# Patient Record
Sex: FEMALE | Race: WHITE | NOT HISPANIC OR LATINO | Employment: OTHER | URBAN - METROPOLITAN AREA
[De-identification: names, ages, dates, MRNs, and addresses within clinical notes are randomized per-mention and may not be internally consistent; named-entity substitution may affect disease eponyms.]

---

## 2017-07-07 ENCOUNTER — GENERIC CONVERSION - ENCOUNTER (OUTPATIENT)
Dept: OTHER | Facility: OTHER | Age: 82
End: 2017-07-07

## 2017-10-11 ENCOUNTER — GENERIC CONVERSION - ENCOUNTER (OUTPATIENT)
Dept: OTHER | Facility: OTHER | Age: 82
End: 2017-10-11

## 2017-10-11 DIAGNOSIS — E78.5 HYPERLIPIDEMIA: ICD-10-CM

## 2018-01-15 NOTE — PROGRESS NOTES
Assessment    1  Acute bronchitis (466 0) (J20 9)    Plan   Continue with current medications  Reassured her that she is improving and that no further treatment will probably be necessary  Patient seems content with that answer and feels better that she does not need further medication to     Chief Complaint  FOLLOW UP URGENT CARE 01/18/2016  BRONCHITIS  History of Present Illness  Patient is an 59-year-old female presented to the office complaining of coughing congestion  Went to the urgent care 4 days ago had a normal chest x-ray  She was concerned that she was continuing to cough  The cough continues to improve, less severe, less productive, less frequent  The patient is being seen for follow-up of a recent acute care evaluation of acute bronchitis  The last clinic visit was 1 1/2 week(s) ago  Symptoms:  non-productive cough and muscle ache, but no wheezing, no shortness of breath, no fever, no fatigue, no sore throat, no rhinorrhea, no pleuritic chest pain and no chest pain  The sputum is described as clear  Onset was sudden 2 week(s) ago  The symptoms occur constantly  Relieving factors:  Tessalon Perles  Associated symptoms:  no rapid breathing, no rapid heart rate, no postnasal drainage, no headache and no hemoptysis  Current treatment includes Tessalon Perles  By report, there is good compliance with treatment, good tolerance of treatment and good symptom control  She was previously evaluated in Urgent Care and Initially seen in this office on January 4, seen in the urgent care Center 4 days ago  Presenting symptoms included productive cough and rhinorrhea  Past evaluation has included chest x-ray  Past treatment has included opioid cough medicine  Review of Systems    Constitutional: No fever, no chills, feels well, no tiredness, no recent weight gain or weight loss  Eyes: No complaints of eye pain, no red eyes, no eyesight problems, no discharge, no dry eyes, no itching of eyes     ENT: no complaints of earache, no loss of hearing, no nose bleeds, no nasal discharge, no sore throat, no hoarseness  Cardiovascular: No complaints of slow heart rate, no fast heart rate, no chest pain, no palpitations, no leg claudication, no lower extremity edema  Respiratory: as noted in HPI  Gastrointestinal: No complaints of abdominal pain, no constipation, no nausea or vomiting, no diarrhea, no bloody stools  Genitourinary: No complaints of dysuria, no incontinence, no pelvic pain, no dysmenorrhea, no vaginal discharge or bleeding  Musculoskeletal: No complaints of arthralgias, no myalgias, no joint swelling or stiffness, no limb pain or swelling  Integumentary: No complaints of skin rash or lesions, no itching, no skin wounds, no breast pain or lump  Neurological: No complaints of headache, no confusion, no convulsions, no numbness, no dizziness or fainting, no tingling, no limb weakness, no difficulty walking  Psychiatric: Not suicidal, no sleep disturbance, no anxiety or depression, no change in personality, no emotional problems  Endocrine: No complaints of proptosis, no hot flashes, no muscle weakness, no deepening of the voice, no feelings of weakness  Hematologic/Lymphatic: No complaints of swollen glands, no swollen glands in the neck, does not bleed easily, does not bruise easily  Active Problems    1  Acute bronchitis (466 0) (J20 9)   2  Cold (460) (J00)   3  Cough (786 2) (R05)   4  Gastroesophageal reflux disease (530 81) (K21 9)   5  Hyperlipidemia (272 4) (E78 5)   6  Left carpal tunnel syndrome (354 0) (G56 02)   7  Low back pain (724 2) (M54 5)   8  Mid back pain (724 5) (M54 9)   9  Osteoarthritis of left knee (715 96) (M17 9)    Past Medical History    1  History of Atypical chest pain (786 59) (R07 89)   2  History of Bullous pemphigoid (694 5) (L12 0)   3  History of Calcific Tendonitis Of Hand (727 82)   4  History of Cellulitis (682 9) (L03 90)   5   History of Chronic kidney disease, stage 3 (585 3) (N18 3)   6  History of Colon, diverticulosis (562 10) (K57 30)   7  History of External Hemorrhoids (455 3)   8  History of Glaucoma screening (V80 1) (Z13 5)   9  H/O renal cell carcinoma (V10 52) (Z85 528)   10  History of abdominal pain (V13 89) (Z87 898)   11  History of abdominal pain (V13 89) (Z87 898)   12  History of acute bronchitis (V12 69) (Z87 09)   13  History of acute gastritis (V12 70) (Z87 19)   14  History of diverticulitis of colon (V12 79) (Z87 19)   15  History of fatigue (V13 89) (Z87 898)   16  History of gout (V12 29) (Z87 39)   17  History of intestinal obstruction (V12 79) (Z87 19)   18  History of nausea and vomiting (V12 79) (Z87 898)   19  History of Ingrown hair (704 8) (L73 1)   20  History of Lumbar disc disorder with myelopathy (722 73) (M51 06)   21  History of Malignant neoplasm of kidney, unspecified laterality (189 0) (C64 9)   22  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)   23  History of Restless legs syndrome (333 94) (G25 81)   24  History of Synovial cyst of popliteal space (727 51) (M71 20)   25  History of Upper respiratory infection (465 9) (J06 9)   26  Urinary tract infection (599 0) (N39 0)    The active problems and past medical history were reviewed and updated today  Surgical History    1  History of Breast Surgery Reduction Procedure   2  History of Cataract Surgery   3  History of Nephrectomy   4  History of Partial Colectomy   5  History of Total Abdominal Hysterectomy    Family History    1  No pertinent family history    2  Family history of No Significant Family History    Social History    · Being A Social Drinker   · Daily Coffee Consumption (3  Cups/Day)   · Former smoker (V15 82) (X18 857)   · Uses Safety Equipment - Seatbelts  The social history was reviewed and updated today  The social history was reviewed and is unchanged  Current Meds   1   Allopurinol 100 MG Oral Tablet; TAKE TWO TABLETS BY MOUTH DAILY; Therapy: 16ZHL6496 to (Evaluate:24Jan2016)  Requested for: 22VWS2652; Last   Rx:19Mhk5072 Ordered   2  Benzonatate 200 MG Oral Capsule; TAKE 1 CAPSULE 3 TIMES DAILY AS NEEDED FOR   COUGH  NOT TO BE TAKEN PRIOR TO DRIVING OR OPERATING MACHINERY AS IT   CAN CAUSE DROWSINESS; Therapy: 32NCZ3718 to (Evaluate:25Jan2016); Last Rx:18Jan2016 Ordered   3  Hydrocortisone 2 5 % Rectal Cream; apply small amount to effected area 4 times daily   for 10 dyas; Therapy: 70IJV9514 to (Last Rx:03Jan2014)  Requested for: 82APR3242 Ordered   4  Ocuvite Oral Tablet; TAKE 1 TABLET DAILY; Therapy: (Recorded:26Eqc7798) to Recorded   5  Omeprazole 20 MG Oral Capsule Delayed Release; TAKE 1 CAPSULE TWICE DAILY; Therapy: 69LDG4820 to (Evaluate:96Ggl2545)  Requested for: 80Kkx2098; Last   Rx:16Xip6188 Ordered   6  Pravastatin Sodium 80 MG Oral Tablet; take 1 tablet every day; Therapy: 60CJX6735 to (Evaluate:10Jan2016)  Requested for: 66YHP5344; Last   Rx:15Fiy3392 Ordered   7  PreserVision AREDS 2 CAPS; TAKE AS DIRECTED; Therapy: (Recorded:54Gxf4563) to Recorded    The medication list was reviewed and updated today  Allergies    1  No Known Drug Allergies    Vitals  Vital Signs [Data Includes: Current Encounter]    Recorded: 21Jan2016 11:58AM   Temperature 24 4 F   Systolic 315   Diastolic 80   Height 5 ft 1 in   Weight 137 lb 6 oz   BMI Calculated 25 96   BSA Calculated 1 61     Physical Exam    Constitutional   General appearance: No acute distress, well appearing and well nourished  Eyes   Conjunctiva and lids: No swelling, erythema or discharge  Pupils and irises: Equal, round and reactive to light  Ears, Nose, Mouth, and Throat   External inspection of ears and nose: Normal     Otoscopic examination: Tympanic membranes translucent with normal light reflex  Canals patent without erythema  Nasal mucosa, septum, and turbinates: Normal without edema or erythema      Oropharynx: Normal with no erythema, edema, exudate or lesions  Pulmonary   Respiratory effort: No increased work of breathing or signs of respiratory distress  Auscultation of lungs: Clear to auscultation  Cardiovascular   Palpation of heart: Normal PMI, no thrills  Auscultation of heart: Normal rate and rhythm, normal S1 and S2, without murmurs  Examination of extremities for edema and/or varicosities: Normal     Carotid pulses: Normal     Abdomen   Abdomen: Non-tender, no masses  Liver and spleen: No hepatomegaly or splenomegaly  Lymphatic   Palpation of lymph nodes in neck: No lymphadenopathy  Musculoskeletal   Gait and station: Normal     Digits and nails: Normal without clubbing or cyanosis  Inspection/palpation of joints, bones, and muscles: Normal     Skin   Skin and subcutaneous tissue: Normal without rashes or lesions  Neurologic   Cranial nerves: Cranial nerves 2-12 intact  Reflexes: 2+ and symmetric  Sensation: No sensory loss      Psychiatric   Orientation to person, place, and time: Normal     Mood and affect: Normal          Signatures   Electronically signed by : Marjorie Hood DO; Jan 21 2016 12:37PM EST                       (Author)

## 2018-01-18 NOTE — PROGRESS NOTES
Assessment    1  Acute bronchitis (466 0) (J20 9)   2  Mid back pain (724 5) (M54 9)    Plan  Acute bronchitis    · Benzonatate 200 MG Oral Capsule; TAKE 1 CAPSULE 3 TIMES DAILY AS NEEDED  FOR COUGH  NOT TO BE TAKEN PRIOR TO DRIVING OR OPERATING MACHINERY AS  IT CAN CAUSE DROWSINESS   · Avoid exposure to cigarette smoke ; Status:Complete;   Done: 83GEE0936   · Drink plenty of fluids ; Status:Complete;   Done: 24QJW1477   · Use a cool mist humidifier in the room ; Status:Complete;   Done: 65MNY9115   · Use a cough medicine to help you get adequate rest ; Status:Complete;   Done:  95DDU8570   · Seek Immediate Medical Attention if: ; Status:Complete;   Done: 53AAE5500   · Seek Immediate Medical Attention if: You are feeling short of breath ; Status:Complete;    Done: 47TBY2968   · Seek Immediate Medical Attention if: You have difficulty breathing, or you are short of  breath more often ; Status:Complete;   Done: 76ZYR9694   · Seek Immediate Medical Attention if: You notice that breathing is rapid, more than 40  times a minute ; Status:Complete;   Done: 26MDL3630  Cough    · * XR CHEST PA & LATERAL; Status:Active; Requested TDW:51HFP7142;     Discussion/Summary  Discussion Summary:   1  Acute Bronchitis   - awaiting official CXR results   - Zithromax course completed   - may take Promethazine w/ codeine at bedtime as needed for cough  - prescribing Tessalon pearls to be taken during the day as needed for cough  - advised to rest and drink and plenty of fluids   - instructed to follow up w/ PCP later this week for a re-check  2  Mid back pain likely due to a muscle strain   - advised a heating pad, rest, and Tylenol or Motrin as needed for pain   - follow up w/ PCP later this week for a re-check  Medication Side Effects Reviewed: Possible side effects of new medications were reviewed with the patient/guardian today  Understands and agrees with treatment plan:  The treatment plan was reviewed with the patient/guardian  The patient/guardian understands and agrees with the treatment plan   Counseling Documentation With Imm: The patient, patient's family was counseled regarding instructions for management, importance of compliance with treatment  Follow Up Instructions: Follow Up with your Primary Care Provider in 3-5 days  If your symptoms worsen, go to the nearest [place] [address] Emergency Department  Chief Complaint    1  Cold Symptoms  Chief Complaint Free Text Note Form: pt here for cold symptoms      History of Present Illness  HPI: 79 yo F presents c/o cold symptoms that have been going on for about 1 month  States the upper resp tract infection symptoms have resolved however the cough still persists, describes as productive sometimes  No fever/chills  No chest pain, SOB, or wheezing  Non-smoker  Seen by PCP on 1/4/15 for same sx, was prescribed Bactrim and cough syrup w/ codeine  States she was later switched to Azithromycin and put on a course of steroids, states she took her last pill of the antibiotic this morning  Also c/o left sided mid back pain for the past few days which she thinks is due to coughing  Denies any trauma to the back  No falls, injuries, or accidents  Has been applying a heating pad  Hospital Based Practices Required Assessment:   Pain Assessment   the patient states they do not have pain  Abuse And Domestic Violence Screen    Yes, the patient is safe at home  The patient states no one is hurting them  Depression And Suicide Screen  No, the patient has not had thoughts of hurting themself  No, the patient has not felt depressed in the past 7 days  Prefered Language is  english  Primary Language is  english     Cold Symptoms:   Associated symptoms include productive cough, but no sneezing, no nasal congestion, no runny nose, no post nasal drainage, no scratchy throat, no sore throat, no hoarseness, no dry cough, no facial pressure, no facial pain, no headache, no plugged ear(s), no ear pain, no swollen lymph nodes, no wheezing, no shortness of breath, no fatigue, no weakness, no nausea, no vomiting, no diarrhea, no fever and no chills  Review of Systems  Focused-Female:   Constitutional: No fever, no chills, feels well, no tiredness, no recent weight gain or loss  ENT: no ear ache, no loss of hearing, no nosebleeds or nasal discharge, no sore throat or hoarseness  Cardiovascular: no complaints of slow or fast heart rate, no chest pain, no palpitations, no leg claudication or lower extremity edema, no chest pain and no palpitations  Respiratory: cough, but as noted in HPI, no shortness of breath and no wheezing  Gastrointestinal: no complaints of abdominal pain, no constipation, no nausea or diarrhea, no vomiting, no bloody stools  Musculoskeletal: as noted in HPI  Integumentary: no complaints of skin rash or lesion, no itching or dry skin, no skin wounds  Neurological: no complaints of headache, no confusion, no numbness or tingling, no dizziness or fainting  Active Problems    1  Gastroesophageal reflux disease (530 81) (K21 9)   2  Hyperlipidemia (272 4) (E78 5)   3  Left carpal tunnel syndrome (354 0) (G56 02)   4  Low back pain (724 2) (M54 5)   5  Osteoarthritis of left knee (715 96) (M17 9)    Past Medical History    1  History of Atypical chest pain (786 59) (R07 89)   2  History of Bullous pemphigoid (694 5) (L12 0)   3  History of Calcific Tendonitis Of Hand (727 82)   4  History of Cellulitis (682 9) (L03 90)   5  History of Chronic kidney disease, stage 3 (585 3) (N18 3)   6  History of Colon, diverticulosis (562 10) (K57 30)   7  History of External Hemorrhoids (455 3)   8  History of Glaucoma screening (V80 1) (Z13 5)   9  H/O renal cell carcinoma (V10 52) (Z85 528)   10  History of abdominal pain (V13 89) (Z87 898)   11  History of abdominal pain (V13 89) (Z87 898)   12  History of acute bronchitis (V12 69) (Z87 09)   13   History of acute gastritis (V12 70) (Z87 19)   14  History of diverticulitis of colon (V12 79) (Z87 19)   15  History of fatigue (V13 89) (Z87 898)   16  History of gout (V12 29) (Z87 39)   17  History of intestinal obstruction (V12 79) (Z87 19)   18  History of nausea and vomiting (V12 79) (Z87 898)   19  History of Ingrown hair (704 8) (L73 1)   20  History of Lumbar disc disorder with myelopathy (722 73) (M51 06)   21  History of Malignant neoplasm of kidney, unspecified laterality (189 0) (C64 9)   22  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)   23  History of Restless legs syndrome (333 94) (G25 81)   24  History of Synovial cyst of popliteal space (727 51) (M71 20)   25  History of Upper respiratory infection (465 9) (J06 9)   26  Urinary tract infection (599 0) (N39 0)    Family History    1  No pertinent family history    2  Family history of No Significant Family History    Social History    · Being A Social Drinker   · Daily Coffee Consumption (3  Cups/Day)   · Former smoker (V15 82) (K50 041)   · Uses Safety Equipment - Seatbelts    Surgical History    1  History of Breast Surgery Reduction Procedure   2  History of Cataract Surgery   3  History of Nephrectomy   4  History of Partial Colectomy   5  History of Total Abdominal Hysterectomy    Current Meds   1  Allopurinol 100 MG Oral Tablet; TAKE TWO TABLETS BY MOUTH DAILY; Therapy: 19XUU2695 to (Evaluate:24Jan2016)  Requested for: 06OUY5824; Last   Rx:26Oct2015 Ordered   2  Hydrocortisone 2 5 % Rectal Cream; apply small amount to effected area 4 times daily   for 10 dyas; Therapy: 63GTT2783 to (Last Rx:03Jan2014)  Requested for: 10UAP1545 Ordered   3  Ocuvite Oral Tablet; TAKE 1 TABLET DAILY; Therapy: (Recorded:44Wgb5920) to Recorded   4  Omeprazole 20 MG Oral Capsule Delayed Release; TAKE 1 CAPSULE TWICE DAILY; Therapy: 80LQO5455 to (Evaluate:47Iuk6097)  Requested for: 68Drn7896; Last   Rx:98Cqj6465 Ordered   5   Pravastatin Sodium 80 MG Oral Tablet; take 1 tablet every day; Therapy: 75RQA5375 to (Evaluate:10Jan2016)  Requested for: 74PZC9931; Last   Rx:55Mgd5092 Ordered   6  PreserVision AREDS 2 CAPS; TAKE AS DIRECTED; Therapy: (Recorded:12Ubw0508) to Recorded    Allergies    1  No Known Drug Allergies    Vitals  Signs [Data Includes: Current Encounter]   Recorded: 03BYW3293 10:55AM   Temperature: 96 3 F  Heart Rate: 71  Respiration: 20  Systolic: 189  Diastolic: 764  Height: 5 ft 1 in  Weight: 136 lb   BMI Calculated: 25 7  BSA Calculated: 1 6  O2 Saturation: 97  Pain Scale: 0    Physical Exam    Constitutional   General appearance: No acute distress, well appearing and well nourished  Eyes   Conjunctiva and lids: No swelling, erythema or discharge  Pupils and irises: Equal, round and reactive to light  Ears, Nose, Mouth, and Throat   External inspection of ears and nose: Normal     Otoscopic examination: Tympanic membranes translucent with normal light reflex  Canals patent without erythema  Nasal mucosa, septum, and turbinates: Normal without edema or erythema  Oropharynx: Normal with no erythema, edema, exudate or lesions  Pulmonary   Respiratory effort: No increased work of breathing or signs of respiratory distress  Auscultation of lungs: Abnormal   very mild expiratory wheezing  Cardiovascular   Auscultation of heart: Normal rate and rhythm, normal S1 and S2, without murmurs  Lymphatic   Palpation of lymph nodes in neck: No lymphadenopathy  Musculoskeletal   Inspection/palpation of joints, bones, and muscles: Abnormal   mild tenderness to palpation along the thoracic paraspinal region  No swelling, erythema, warmth, or bruising  No step offs palpated  No skin rashes  Skin   Skin and subcutaneous tissue: Normal without rashes or lesions      Psychiatric   Orientation to person, place, and time: Normal     Mood and affect: Normal        Signatures   Electronically signed by : Shoshana Soulier, M D ; Jan 18 2016 11:51AM EST (Author)

## 2019-08-07 ENCOUNTER — TRANSCRIBE ORDERS (OUTPATIENT)
Dept: ADMINISTRATIVE | Facility: HOSPITAL | Age: 84
End: 2019-08-07

## 2019-08-07 DIAGNOSIS — Z85.528 PERSONAL HISTORY OF RENAL CANCER: Primary | ICD-10-CM

## 2019-08-09 ENCOUNTER — HOSPITAL ENCOUNTER (OUTPATIENT)
Dept: ULTRASOUND IMAGING | Facility: MEDICAL CENTER | Age: 84
Discharge: HOME | End: 2019-08-09
Payer: MEDICARE

## 2019-08-09 DIAGNOSIS — Z85.528 PERSONAL HISTORY OF RENAL CANCER: ICD-10-CM

## 2019-08-09 PROCEDURE — 76770 US EXAM ABDO BACK WALL COMP: CPT

## 2020-07-02 NOTE — H&P (VIEW-ONLY)
Colon and Rectal Surgery   Johnathan Conner 80 y o  female MRN: [id number]   Encounter: [id number]  07/06/20   4:57 PM    ASSESSMENT/PLAN:   Anal inflammation/ulceration, multiple physicians seen and multiple biopsies taken, records review includes [facility] Salter notes 1/2020 stating that these ulcers are present 5 years, and references pelvic MRI 2/2017 normal   Discussed with her that this is a chronic and ongoing problem I cannot rule out anal Crohn's disease or other rheumatologic spectrum        Ongoing anal ulceration today without improvement, discussed need for biopsies rule out dysplasia, also on review with poor prep colonoscopy will try for at least Sigmoidoscopy with exam under anesthesia with anal biopsies in OR  HPI  Johnathan Conner is a 80 y o  female is here today for a follow up to anal inflammation  Her last office visit was on 6/1/20, prescribed budesonide 9 mg  She reports she has been taking the budesonide as prescribed and has seen little to no improvement in her symptoms  She has not used any suppositories since her last office visit  She has a history of sigmoid resection for diverticulitis, sphincterectomy in 2016 for anal fissure  Her most recent colonoscopy was December of 2016 with Dr Wanda Pierre, difficult intubation to cecum with poor visualization on scanned document reviewed        Past medical history- care everywhere  Hx renal cell cancer  Anal fissure  Hypertrophic cardiomyopathy  GERD   Mixed hyperlipidemia  Lung nodule- stable  Vitamin D deficiency    Pshx  Sigmoid colectomy  Nephrectomy  Sphincterotomy    Meds/Allergies       Current Outpatient Medications:     allopurinol (ZYLOPRIM) 100 mg tablet, Take 100 mg by mouth 2 (two) times a day, Disp: , Rfl:     budesonide (ENTOCORT EC) 3 MG capsule, Take 3 capsules (9 mg total) by mouth daily, Disp: 90 capsule, Rfl: 3    fluticasone (FLONASE) 50 mcg/act nasal spray, USE ONE SPRAY IN EACH NOSTRIL TWICE DAILY, Disp: , Rfl:     JER 0 01 % ophthalmic drops, , Disp: , Rfl:     Multiple Vitamins-Minerals (PRESERVISION AREDS 2) CAPS, Take by mouth, Disp: , Rfl:       No Known Allergies      Social History   Social History     Substance and Sexual Activity   Alcohol Use Not on file     Social History     Substance and Sexual Activity   Drug Use Not on file     Social History     Tobacco Use   Smoking Status Not on file         Family History:   Family History   Problem Relation Age of Onset    Colon cancer Neg Hx        Review of Systems   Constitutional: Negative  HENT: Negative  Eyes: Negative  Respiratory: Negative  Cardiovascular: Negative  Gastrointestinal: Positive for rectal pain  Endocrine: Negative  Genitourinary: Negative  Musculoskeletal: Negative  Skin: Negative  Allergic/Immunologic: Negative  Neurological: Negative  Hematological: Negative  Psychiatric/Behavioral: Negative          Objective     Current Vitals:   Vitals:    07/06/20 1248   Weight: 58 5 kg (129 lb)         Physical Exam:  General:no distress  Eyes:perrla/eomi  ENT:moist mucus membranes  Neck:supple  Pulm:no increased work of breathing, clear bilateral  CV:sinus  Abdomen:soft,nontender  Rectal:anal ulcerations/leukoplakia as previously described, tender digital exam discontinued  Extremities:no edema

## 2020-07-08 DIAGNOSIS — Z11.59 SPECIAL SCREENING EXAMINATION FOR UNSPECIFIED VIRAL DISEASE: ICD-10-CM

## 2020-07-08 PROCEDURE — U0003 INFECTIOUS AGENT DETECTION BY NUCLEIC ACID (DNA OR RNA); SEVERE ACUTE RESPIRATORY SYNDROME CORONAVIRUS 2 (SARS-COV-2) (CORONAVIRUS DISEASE [COVID-19]), AMPLIFIED PROBE TECHNIQUE, MAKING USE OF HIGH THROUGHPUT TECHNOLOGIES AS DESCRIBED BY CMS-2020-01-R: HCPCS

## 2020-07-08 RX ORDER — ACETAMINOPHEN 325 MG/1
650 TABLET ORAL EVERY 6 HOURS PRN
COMMUNITY

## 2020-07-08 RX ORDER — BRIMONIDINE TARTRATE, TIMOLOL MALEATE 2; 5 MG/ML; MG/ML
1 SOLUTION/ DROPS OPHTHALMIC EVERY 12 HOURS SCHEDULED
COMMUNITY

## 2020-07-08 NOTE — PRE-PROCEDURE INSTRUCTIONS
Pre-Surgery Instructions:   Medication Instructions    acetaminophen (TYLENOL) 325 mg tablet Instructed patient per Anesthesia Guidelines   allopurinol (ZYLOPRIM) 100 mg tablet Instructed patient per Anesthesia Guidelines   brimonidine-timolol (Combigan) 0 2-0 5 % Instructed patient per Anesthesia Guidelines   budesonide (ENTOCORT EC) 3 MG capsule Instructed patient per Anesthesia Guidelines   Cetirizine-Pseudoephedrine (KLS ALLER-INNA D PO) Instructed patient per Anesthesia Guidelines   fluticasone (FLONASE) 50 mcg/act nasal spray Instructed patient per Anesthesia Guidelines   LUMIGAN 0 01 % ophthalmic drops Instructed patient per Anesthesia Guidelines   Multiple Vitamins-Minerals (PRESERVISION AREDS 2) CAPS Instructed patient per Anesthesia Guidelines   Multiple Vitamins-Minerals (PRESERVISION AREDS PO) Instructed patient per Anesthesia Guidelines   VITAMIN D PO Instructed patient per Anesthesia Guidelines  Have you had / have a sore throat? No  have you had / have a cough less than 1 week? No  Have you had / have a fever greater than 100 0 - 100  4? No  Are you experiencing any shortness of breath? No    Review with patient via phone medications and showering instructions  Advice stop vitamins week prior day of surgery  Advice need covid test done 7 days prior DOS  Advice ASC call  Verbalized understanding  Acetaminophen Med Class   Continue to take this medication on your normal schedule  If this is an oral medication and you take it in the morning, then you may take this medicine with a sip of water  Anti-gout Med Class   Continue to take this medication on your normal schedule  If this is an oral medication and you take it in the morning, then you may take this medicine with a sip of water  Steroids Med Class   Continue to take this medication on your normal schedule    If this is an oral medication and you take it in the morning, then you may take this medicine with a sip of water  Vitamin Med Class   You may continue to take any vitamin that your surgeon has prescribed to you up to the day before surgery   If your surgeon has not specifically prescribed this vitamin or instructed you to continue then stop taking 7 days prior to surgery

## 2020-07-09 LAB
INPATIENT: NORMAL
SARS-COV-2 RNA SPEC QL NAA+PROBE: NOT DETECTED

## 2020-07-15 ENCOUNTER — HOSPITAL ENCOUNTER (OUTPATIENT)
Facility: HOSPITAL | Age: 85
Setting detail: OUTPATIENT SURGERY
Discharge: HOME | End: 2020-07-15
Attending: COLON & RECTAL SURGERY | Admitting: COLON & RECTAL SURGERY
Payer: COMMERCIAL

## 2020-07-15 ENCOUNTER — ANESTHESIA EVENT (OUTPATIENT)
Dept: PERIOP | Facility: HOSPITAL | Age: 85
End: 2020-07-15
Payer: COMMERCIAL

## 2020-07-15 ENCOUNTER — ANESTHESIA (OUTPATIENT)
Dept: PERIOP | Facility: HOSPITAL | Age: 85
End: 2020-07-15
Payer: COMMERCIAL

## 2020-07-15 VITALS
BODY MASS INDEX: 24.54 KG/M2 | WEIGHT: 125 LBS | SYSTOLIC BLOOD PRESSURE: 145 MMHG | OXYGEN SATURATION: 96 % | HEART RATE: 67 BPM | DIASTOLIC BLOOD PRESSURE: 74 MMHG | RESPIRATION RATE: 18 BRPM | TEMPERATURE: 96.3 F | HEIGHT: 60 IN

## 2020-07-15 DIAGNOSIS — K62.6 ANAL ULCERATION: Primary | ICD-10-CM

## 2020-07-15 PROCEDURE — 88312 SPECIAL STAINS GROUP 1: CPT | Performed by: PATHOLOGY

## 2020-07-15 PROCEDURE — 88304 TISSUE EXAM BY PATHOLOGIST: CPT | Performed by: PATHOLOGY

## 2020-07-15 PROCEDURE — 88344 IMHCHEM/IMCYTCHM EA MLT ANTB: CPT | Performed by: PATHOLOGY

## 2020-07-15 PROCEDURE — 88342 IMHCHEM/IMCYTCHM 1ST ANTB: CPT | Performed by: PATHOLOGY

## 2020-07-15 PROCEDURE — 88341 IMHCHEM/IMCYTCHM EA ADD ANTB: CPT | Performed by: PATHOLOGY

## 2020-07-15 PROCEDURE — 45990 SURG DX EXAM ANORECTAL: CPT | Performed by: COLON & RECTAL SURGERY

## 2020-07-15 RX ORDER — HYDROCODONE BITARTRATE AND ACETAMINOPHEN 5; 325 MG/1; MG/1
1 TABLET ORAL EVERY 6 HOURS PRN
Qty: 5 TABLET | Refills: 0 | Status: SHIPPED | OUTPATIENT
Start: 2020-07-15 | End: 2020-07-20

## 2020-07-15 RX ORDER — METOCLOPRAMIDE HYDROCHLORIDE 5 MG/ML
5 INJECTION INTRAMUSCULAR; INTRAVENOUS ONCE AS NEEDED
Status: DISCONTINUED | OUTPATIENT
Start: 2020-07-15 | End: 2020-07-15 | Stop reason: HOSPADM

## 2020-07-15 RX ORDER — EPHEDRINE SULFATE 50 MG/ML
INJECTION INTRAVENOUS AS NEEDED
Status: DISCONTINUED | OUTPATIENT
Start: 2020-07-15 | End: 2020-07-15 | Stop reason: SURG

## 2020-07-15 RX ORDER — BUPIVACAINE HYDROCHLORIDE 2.5 MG/ML
INJECTION, SOLUTION EPIDURAL; INFILTRATION; INTRACAUDAL AS NEEDED
Status: DISCONTINUED | OUTPATIENT
Start: 2020-07-15 | End: 2020-07-15 | Stop reason: HOSPADM

## 2020-07-15 RX ORDER — DEXMEDETOMIDINE HYDROCHLORIDE 100 UG/ML
INJECTION, SOLUTION INTRAVENOUS AS NEEDED
Status: DISCONTINUED | OUTPATIENT
Start: 2020-07-15 | End: 2020-07-15 | Stop reason: SURG

## 2020-07-15 RX ORDER — ONDANSETRON 2 MG/ML
4 INJECTION INTRAMUSCULAR; INTRAVENOUS ONCE AS NEEDED
Status: DISCONTINUED | OUTPATIENT
Start: 2020-07-15 | End: 2020-07-15 | Stop reason: HOSPADM

## 2020-07-15 RX ORDER — PROPOFOL 10 MG/ML
INJECTION, EMULSION INTRAVENOUS AS NEEDED
Status: DISCONTINUED | OUTPATIENT
Start: 2020-07-15 | End: 2020-07-15 | Stop reason: SURG

## 2020-07-15 RX ORDER — GLYCOPYRROLATE 0.2 MG/ML
INJECTION INTRAMUSCULAR; INTRAVENOUS AS NEEDED
Status: DISCONTINUED | OUTPATIENT
Start: 2020-07-15 | End: 2020-07-15 | Stop reason: SURG

## 2020-07-15 RX ORDER — LIDOCAINE HYDROCHLORIDE 10 MG/ML
INJECTION, SOLUTION EPIDURAL; INFILTRATION; INTRACAUDAL; PERINEURAL AS NEEDED
Status: DISCONTINUED | OUTPATIENT
Start: 2020-07-15 | End: 2020-07-15 | Stop reason: SURG

## 2020-07-15 RX ORDER — KETAMINE HYDROCHLORIDE 50 MG/ML
INJECTION, SOLUTION, CONCENTRATE INTRAMUSCULAR; INTRAVENOUS AS NEEDED
Status: DISCONTINUED | OUTPATIENT
Start: 2020-07-15 | End: 2020-07-15 | Stop reason: SURG

## 2020-07-15 RX ORDER — SODIUM CHLORIDE, SODIUM LACTATE, POTASSIUM CHLORIDE, CALCIUM CHLORIDE 600; 310; 30; 20 MG/100ML; MG/100ML; MG/100ML; MG/100ML
INJECTION, SOLUTION INTRAVENOUS CONTINUOUS PRN
Status: DISCONTINUED | OUTPATIENT
Start: 2020-07-15 | End: 2020-07-15 | Stop reason: SURG

## 2020-07-15 RX ORDER — PROPOFOL 10 MG/ML
INJECTION, EMULSION INTRAVENOUS CONTINUOUS PRN
Status: DISCONTINUED | OUTPATIENT
Start: 2020-07-15 | End: 2020-07-15 | Stop reason: SURG

## 2020-07-15 RX ORDER — FENTANYL CITRATE/PF 50 MCG/ML
12.5 SYRINGE (ML) INJECTION
Status: COMPLETED | OUTPATIENT
Start: 2020-07-15 | End: 2020-07-15

## 2020-07-15 RX ORDER — ALBUTEROL SULFATE 2.5 MG/3ML
2.5 SOLUTION RESPIRATORY (INHALATION) ONCE AS NEEDED
Status: DISCONTINUED | OUTPATIENT
Start: 2020-07-15 | End: 2020-07-15 | Stop reason: HOSPADM

## 2020-07-15 RX ORDER — GINSENG 100 MG
CAPSULE ORAL AS NEEDED
Status: DISCONTINUED | OUTPATIENT
Start: 2020-07-15 | End: 2020-07-15 | Stop reason: HOSPADM

## 2020-07-15 RX ADMIN — PHENYLEPHRINE HYDROCHLORIDE 30 MCG/MIN: 10 INJECTION INTRAVENOUS at 08:59

## 2020-07-15 RX ADMIN — DEXMEDETOMIDINE 8 MCG: 100 INJECTION, SOLUTION, CONCENTRATE INTRAVENOUS at 08:50

## 2020-07-15 RX ADMIN — KETAMINE HYDROCHLORIDE 10 MG: 50 INJECTION, SOLUTION INTRAMUSCULAR; INTRAVENOUS at 08:37

## 2020-07-15 RX ADMIN — PROPOFOL 20 MG: 10 INJECTION, EMULSION INTRAVENOUS at 08:50

## 2020-07-15 RX ADMIN — EPHEDRINE SULFATE 5 MG: 50 INJECTION, SOLUTION INTRAVENOUS at 08:56

## 2020-07-15 RX ADMIN — KETAMINE HYDROCHLORIDE 5 MG: 50 INJECTION, SOLUTION INTRAMUSCULAR; INTRAVENOUS at 08:51

## 2020-07-15 RX ADMIN — SODIUM CHLORIDE, SODIUM LACTATE, POTASSIUM CHLORIDE, AND CALCIUM CHLORIDE: .6; .31; .03; .02 INJECTION, SOLUTION INTRAVENOUS at 08:31

## 2020-07-15 RX ADMIN — FENTANYL CITRATE 12.5 MCG: 50 INJECTION INTRAMUSCULAR; INTRAVENOUS at 09:33

## 2020-07-15 RX ADMIN — GLYCOPYRROLATE 0.1 MG: 0.2 INJECTION, SOLUTION INTRAMUSCULAR; INTRAVENOUS at 08:37

## 2020-07-15 RX ADMIN — FENTANYL CITRATE 12.5 MCG: 50 INJECTION INTRAMUSCULAR; INTRAVENOUS at 09:46

## 2020-07-15 RX ADMIN — PROPOFOL 20 MG: 10 INJECTION, EMULSION INTRAVENOUS at 08:48

## 2020-07-15 RX ADMIN — PROPOFOL 50 MCG/KG/MIN: 10 INJECTION, EMULSION INTRAVENOUS at 08:37

## 2020-07-15 RX ADMIN — DEXMEDETOMIDINE 4 MCG: 100 INJECTION, SOLUTION, CONCENTRATE INTRAVENOUS at 08:46

## 2020-07-15 RX ADMIN — DEXMEDETOMIDINE 4 MCG: 100 INJECTION, SOLUTION, CONCENTRATE INTRAVENOUS at 08:48

## 2020-07-15 RX ADMIN — LIDOCAINE HYDROCHLORIDE 50 MG: 10 INJECTION, SOLUTION EPIDURAL; INFILTRATION; INTRACAUDAL; PERINEURAL at 08:37

## 2020-07-15 NOTE — DISCHARGE INSTRUCTIONS
May shower/tub bathe this evening  There are couple tiny beige sutures left in place  There may be some bleeding/drainage, normal , may use dry gauze    Followup in office 6-8weeks Dr Canales Blend  Referral placed for Dr Paulino [facility] for inflammatory bowel disease evaluation  Vicodin as prescribed for pain not treated by ibuprofen  High fiber diet with metamucil or konsyl 1 tbsp 1-2x/day, increased hydration noncaffeinated beverages  May use Miralax as needed if no bowel movements in next 24-48hours  Call if any concerns [phone]

## 2020-07-15 NOTE — OP NOTE
OPERATIVE REPORT  PATIENT NAME: Radha Escudero    :  1929  MRN: [id number]  Pt Location: [facility] OR ROOM 09    SURGERY DATE: 7/15/2020    Surgeon(s) and Role:     * Narciso Borrero MD - Primary     * Paige Payan MD - Assisting    Preop Diagnosis:  Anal ulceration [K62 6]    Post-Op Diagnosis Codes:     * Anal ulceration [K62 6]    Procedure(s) (LRB):  EXAM UNDER ANESTHESIA (EUA) (N/A)  Anoscopy  Anal biopsies    Specimen(s):  ID Type Source Tests Collected by Time Destination   1 : anal bx Tissue Anus TISSUE EXAM Narciso Borrero MD 7/15/2020 5283        Estimated Blood Loss:   Minimal    Drains:  * No LDAs found *    Anesthesia Type:   IV Sedation with Anesthesia    Operative Indications:  Anal ulceration [K62 6]    Operative Findings:  -no anal stenosis, circumferential denuded anal derm, minimal remaining strips of clear anodermal/epithelium, sloughing of perianal skin  -multiple Metzenbaum biopsies, as well as deeper 3 mm punch biopsies rule out dysplasia  -completely normal appearance to distal rectal mucosa visualized by anoscopy, stool in rectal vault, flexible sigmoidoscopy was not done    Complications:   None    Procedure and Technique:  22-year-old female with multiple years of chronic anal ulceration despite multiple workups, unable to do office examination, discussed exam under anesthesia with biopsies, discussed anorectal surgery and risks including not limited to bleeding, infection, risks of anesthesia, damage to sphincter/incontinence,pain, constipation/impaction/urinary retention  She understood these risks and wished to proceed  She was brought to the operating room where MAC anesthesia was induced without event  Venodynes were on and running throughout the procedure,received no antibiotics as none were medically indicated,placed in the prone jackknife position with attention to joints, extremities and genitalia  Buttocks were taped apart,Betadine prepped,sterile prepped and draped  After appropriate timeout per protocol procedure began with examination under anesthesia, digital rectal examination showed no stenosis, normal rectal mucosa, anoderm with near-complete denuded epithelium and exposed raw tissue with few strips of normal epithelium  Anoscopy with Padilla bivalve anoscope confirmed the above exam, multiple Metzenbaum biopsies were taken in circumference, deeper biopsies were closed with single 3 0 chromic suture for hemostasis, multiple punch biopsies with 3 mm punch taken as well rule out dysplasia  Hemostasis was assured  Bacitracin was placed to the anal area after irrigation  There was no packing placed  4 x 4's and mesh underwear were placed and patient was rotated supine and awakened brought to the recovery room in stable addition  All sponge, needle, instrument/RF wand counts were correct       I was present for the entire procedure    Patient Disposition:  PACU     SIGNATURE: Harjinder Tellez MD  DATE: July 15, 2020  TIME: 11:03 AM

## 2020-07-15 NOTE — ANESTHESIA PREPROCEDURE EVALUATION
Review of Systems/Medical History  Patient summary reviewed  Chart reviewed      Cardiovascular  Exercise tolerance (METS): >4,    Comment: Endorses chest pain secondary to chostochondiritis  Cardiology note: HCM diagnosed in 2017,  Pulmonary  Negative pulmonary ROS        GI/Hepatic      Comment: Anal ulceration     Genitourinary malignancy renal cancer,        Endo/Other  Negative endo/other ROS      GYN  Negative gynecology ROS          Hematology  Negative hematology ROS      Musculoskeletal  Negative musculoskeletal ROS        Neurology  Negative neurology ROS      Psychology   Negative psychology ROS              Physical Exam    Airway    Mallampati score: III  TM Distance: >3 FB  Neck ROM: full     Dental   upper dentures and lower dentures,     Cardiovascular      Pulmonary      Other Findings        Anesthesia Plan  ASA Score- 3     Anesthesia Type- IV sedation with anesthesia with ASA Monitors  Additional Monitors:   Airway Plan:     Comment: Plan for sedation with GAETT as backup        Plan Factors-    Induction- intravenous  Postoperative Plan- Plan for postoperative opioid use  Informed Consent- Anesthetic plan and risks discussed with patient  I personally reviewed this patient with the CRNA  Discussed and agreed on the Anesthesia Plan with the CRNA  Phillip Siegel

## 2020-07-15 NOTE — INTERVAL H&P NOTE
H&P reviewed  After examining the patient I find no changes in the patients condition since the H&P had been written    Lungs Clear bilateral  Heart Sinus Rhythm  Vitals:    07/15/20 0722   BP: 133/54   Pulse: 64   Resp: 16   Temp: (!) 96 9 °F (36 1 °C)   SpO2: 97%         Vitals:    07/15/20 0722   BP: 133/54   Pulse: 64   Resp: 16   Temp: (!) 96 9 °F (36 1 °C)   SpO2: 97%

## 2020-07-15 NOTE — ANESTHESIA POSTPROCEDURE EVALUATION
Post-Op Assessment Note    CV Status:  Stable  Pain Score: 5 (0 at rest, 5 with movement)    Pain management: adequate     Mental Status:  Alert and awake   Hydration Status:  Euvolemic and stable   PONV Controlled:  Controlled   Airway Patency:  Patent   Post Op Vitals Reviewed: Yes      Staff: Anesthesiologist, CRNA           BP   105/54   Temp     Pulse 74   Resp 16   SpO2 100%

## 2020-07-23 ENCOUNTER — OFFICE VISIT (OUTPATIENT)
Dept: GASTROENTEROLOGY | Facility: MEDICAL CENTER | Age: 85
End: 2020-07-23
Payer: COMMERCIAL

## 2020-07-23 VITALS
DIASTOLIC BLOOD PRESSURE: 68 MMHG | BODY MASS INDEX: 24.76 KG/M2 | TEMPERATURE: 97.8 F | WEIGHT: 126.8 LBS | HEART RATE: 66 BPM | SYSTOLIC BLOOD PRESSURE: 106 MMHG

## 2020-07-23 DIAGNOSIS — K62.6 ANAL ULCERATION: Primary | ICD-10-CM

## 2020-07-23 PROCEDURE — 99205 OFFICE O/P NEW HI 60 MIN: CPT | Performed by: INTERNAL MEDICINE

## 2020-07-23 NOTE — PATIENT INSTRUCTIONS
Today we discussed:  Try applying Triple Paste cream to the anal area at least 2-3 times per day, covering with a soft guaze     When you wipe, try to use very soft toilet paper or hypoallergenic unscented soft wipes  We are awaiting the biopsies to look for evidence of possible anal Crohn's disease

## 2020-07-23 NOTE — PROGRESS NOTES
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 who presents with complaint of anal ulceration  She recent had an anoscopy and EUA with colorectal surgery and biopsies are pending  There is a question whether this could be anal Crohn's, and the biopsies cane be helpful, although I suspect this is not the etiology of her symptoms  This may be primarily a skin/wound care problem given the location  Available labs and imaging reviewed  1  Anal ulceration        No orders of the defined types were placed in this encounter  Today we discussed:  Try applying Triple Paste cream to the anal area at least 2-3 times per day, covering with a soft guaze  When you wipe, try to use very soft toilet paper or hypoallergenic unscented soft wipes  We are awaiting the biopsies to look for evidence of possible anal Crohn's disease  Further recommendations pending above  Take weekly photos to trend how well it is healing  ______________________________________________________________________    Referred by Dr Hallie Goodman for anal ulceration and possible Crohn's    HPI:    Tavia George is a 80 y o  female with diverticulitis s/p sigmoid resection, 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 who presents with complaint of anal ulceration  She has anal ulcerations for at least 5 years, s/p multiple biopsies and she has been seen by multiple consultants  Prior pelvic MRI from 2017 reportedly normal  She was recently seen by Dr Hallie Goodman and underwent EUS and flex sig  She has been on budesonide with little improvement  She had a prior fissure surgery and it got worse after       Anoscopy: Generalized anal dermal and internal anal canal inflammation, diminutive ulceration right lateral position as seen on effacement  EUS:  -no anal stenosis, circumferential denuded anal derm, minimal remaining strips of clear anodermal/epithelium, sloughing of perianal skin  -multiple Metzenbaum biopsies, as well as deeper 3 mm punch biopsies rule out dysplasia  -completely normal appearance to distal rectal mucosa visualized by anoscopy, stool in rectal vault, flexible sigmoidoscopy was not done    After the procedure she was referred here  She notes pain with a BM, each day, for the past 5 years  The pain is during a BM and it is sore after  The pain is at the anus  No abdominal pain or other rectal pain  She will also see a little bit of bleeding and it is pinkish, with the BM, but not every time  She has 1 BM per day, but occasionally 2-3  The pain has gotten worse recently  She tried entocort but it did not help much  REVIEW OF SYSTEMS:  10 point ROS reviewed and negative, except as above      Historical Information   Past Medical History:   Diagnosis Date    [facility] [address] [facility]     Cancer of kidney, right [facility] [address] [facility]     Colon polyp     Diverticulitis      Past Surgical History:   Procedure Laterality Date    CATARACT EXTRACTION      COLON SIGMOID RESECTION  1995    COLON SURGERY      COLONOSCOPY      KIDNEY SURGERY Right     [place] SURG DIAGNOSTIC EXAM, ANORECTAL N/A 7/15/2020    Procedure: EXAM UNDER ANESTHESIA (EUA);   Surgeon: Donald Horne MD;  Location: [facility];  Service: Colorectal    RECTAL BIOPSY N/A 7/15/2020    Procedure: EXCISION  BIOPSY LESION/ MASS  ANAL/RECTAL;  Surgeon: Donald Horne MD;  Location: [facility];  Service: Colorectal     Social History   Social History     Substance and Sexual Activity   Alcohol Use Yes    Alcohol/week: 2 0 - 3 0 standard drinks    Types: 2 - 3 Glasses of wine per week    Frequency: 2-4 times a month    Drinks per session: 1 or 2     Social History     Substance and Sexual Activity   Drug Use Never     Social History     Tobacco Use   Smoking Status Former Smoker   Smokeless Tobacco Never Used     Family History   Problem Relation Age of Onset    Colon cancer Neg Hx        Meds/Allergies       Current Outpatient Medications:     acetaminophen (TYLENOL) 325 mg tablet    allopurinol (ZYLOPRIM) 100 mg tablet    brimonidine-timolol (Combigan) 0 2-0 5 %    budesonide (ENTOCORT EC) 3 MG capsule    Cetirizine-Pseudoephedrine (KLS ALLER-INNA D PO)    fluticasone (FLONASE) 50 mcg/act nasal spray    LUMIGAN 0 01 % ophthalmic drops    Multiple Vitamins-Minerals (PRESERVISION AREDS 2) CAPS    VITAMIN D PO    Allergies   Allergen Reactions    No Known Allergies            Objective     Blood pressure 106/68, pulse 66, temperature 97 8 °F (36 6 °C), weight 57 5 kg (126 lb 12 8 oz)  Body mass index is 24 76 kg/m²  PHYSICAL EXAMINATION:    General Appearance:   Alert, cooperative, no distress   HEENT:  Normocephalic, atraumatic, anicteric  Neck supple, symmetrical, trachea midline  Lungs:   Equal chest rise and unlabored breathing, normal effort, no coughing  Cardiovascular:   No visualized JVD  Abdomen:   No abdominal distension  No TTP   Skin:   No jaundice, rashes, or lesions  Musculoskeletal:   Normal range of motion visualized  Psych:  Normal affect and normal insight  Neuro:  Alert and appropriate  Lab Results:   No visits with results within 1 Day(s) from this visit     Latest known visit with results is:   Admission on 07/15/2020, Discharged on 07/15/2020   Component Date Value    Case Report 07/15/2020                      Value:Surgical Pathology Report                         Case: [id number]                                   Authorizing Provider:  Jeanmarie Wolfe MD      Collected:           07/15/2020 6823              Ordering Location:     [address] [place] [address]      Received:            07/15/2020 [address] [place], [place] [address]d Operating Room                                                      Pathologist: Amador Soto MD                                                         Specimen:    Anus, anal bx                                                                              Final Diagnosis 07/15/2020                      Value: This result contains rich text formatting which cannot be displayed here   Additional Information 07/15/2020                      Value: This result contains rich text formatting which cannot be displayed here  [facility] Gross Description 07/15/2020                      Value: This result contains rich text formatting which cannot be displayed here  Lab Results   Component Value Date    WBC 9 77 02/19/2015    HGB 14 0 02/19/2015    HCT 42 7 02/19/2015    MCV 96 02/19/2015     02/19/2015       Lab Results   Component Value Date     02/19/2015    K 4 3 02/19/2015     02/19/2015    CO2 32 (H) 02/19/2015    ANIONGAP 4 02/19/2015    BUN 18 02/19/2015    CREATININE 0 93 02/19/2015    CALCIUM 9 3 02/19/2015    AST 21 02/19/2015    ALT 20 02/19/2015    ALKPHOS 85 02/19/2015    PROT 7 2 02/19/2015    BILITOT 0 6 02/19/2015       No results found for: CRP    No results found for: [id number], TSH    No results found for: IRON, TIBC, FERRITIN    Radiology Results:   No results found

## 2020-08-04 ENCOUNTER — TELEPHONE (OUTPATIENT)
Dept: GASTROENTEROLOGY | Facility: AMBULARY SURGERY CENTER | Age: 85
End: 2020-08-04

## 2020-08-04 NOTE — TELEPHONE ENCOUNTER
Dr Barker Dry patient hx anal ulceration, diverticulitis s/p sigmoid resection, sphincterotomy 2016    Patient's daughter Estrella Vasquez called today because patient is still having a lot of pain in her rectum  Anal biopsy did not suggest Crohn's  Patient's daughter confirmed patient has been using triple paste cream to the area 2-3 times a day and covering with a soft gauze, has been wiping carefully with soft toilet paper  She states the area is about 1/2 inch and is causing pain every time patient has a BM, states patient is getting frustrated  She is going to attempt to help patient upload a photo to [employer]  Patient's daughter wants to know what the next step will be in treating this ulceration  She would like to know if patient would benefit from wound care  Follow up visit scheduled for 9/22/20  Please advise

## 2020-08-04 NOTE — TELEPHONE ENCOUNTER
Patients GI provider:  Dr Louvella Siemens    Number to return call: ( daughter Cherelle Galicia # [phone]    Reason for call: Pt received biopsy results and would like to know what the next plan of care will be    Scheduled procedure/appointment date if applicable: Apt/procedure 9-22-20

## 2020-09-22 ENCOUNTER — OFFICE VISIT (OUTPATIENT)
Dept: GASTROENTEROLOGY | Facility: MEDICAL CENTER | Age: 85
End: 2020-09-22
Payer: COMMERCIAL

## 2020-09-22 VITALS
SYSTOLIC BLOOD PRESSURE: 118 MMHG | WEIGHT: 128 LBS | DIASTOLIC BLOOD PRESSURE: 72 MMHG | HEIGHT: 60 IN | BODY MASS INDEX: 25.13 KG/M2 | TEMPERATURE: 96.6 F

## 2020-09-22 DIAGNOSIS — K62.9 ANAL LESION: Primary | ICD-10-CM

## 2020-09-22 PROCEDURE — 99214 OFFICE O/P EST MOD 30 MIN: CPT | Performed by: INTERNAL MEDICINE

## 2020-09-22 NOTE — PROGRESS NOTES
[facility] - Outpatient Follow-up Note  Jacobo Hamilton 80 y o  female MRN: [id number]  Encounter: [id number]          ASSESSMENT AND PLAN:    Jacobo Hamilton is a 80 y o  female with diverticulitis status post sigmoid resection, sphincterotomy for anal fissure in 2016 presents with complaint of ongoing anal ulceration  She had a prior endoscopy an EUA with biopsies which were not consistent anal Crohn's  She has no other GI symptoms aside from this ongoing area of anal irritation  She tried triple paste cream but feels that things got better when she stopped using any creams and when she stopped applying any cost to the area  She does have some lidocaine cream but she has not been using it that much although it has helped  She has seen wound care as well as Colorectal surgery in the past but still feels that the symptoms are ongoing  This may be related to a blood supply issue possibly from her prior sphincterotomy and complicating wound healing  Less likely to be anal Crohn's based on biopsies and absence of other symptoms  Available labs and imaging reviewed  1  Anal lesion        Orders Placed This Encounter   Procedures    Ambulatory referral to Dermatology     Okay to use lidocaine cream as needed for pain and discomfort  She will keep the area try and avoid applying any cause or creams at this time, aside from the lidocaine cream  I recommend that she see a dermatologist for 1 additional opinion as she has never seen a dermatologist before  Continue to follow up with Colorectal surgery as per their recommended schedule  Continue fiber to help with bowel movements    I have spent 25 minutes with Patient and family today in which greater than 50% of this time was spent in counseling/coordination of care regarding Diagnostic results, Prognosis and Impressions            ______________________________________________________________________    SUBJECTIVE:    Jacobo Hamilton is a 80 y o  female who presents with complaint of anal lesion, here for follow-up    It is still sore and sore for a while after  She has been dealing with that for 6 years  She thinks there is little bit of improvement  She was using the triple paste  She was wearing pads and she thought that could be making it worse so she stopped  She is not using anything on her bottom right now  She saw wound care in the past but no answer  She never saw dermatology  She has 1-2 BMs per day and no constipation or loose stools  REVIEW OF SYSTEMS IS OTHERWISE NEGATIVE  10 point ROS reviewed and negative, except as above      Historical Information   Past Medical History:   Diagnosis Date    [facility] [address] [facility]     Cancer of kidney, right ([facility] [address] [facility]     Colon polyp     Diverticulitis      Past Surgical History:   Procedure Laterality Date    CATARACT EXTRACTION      COLON SIGMOID RESECTION  1995    COLON SURGERY      COLONOSCOPY      KIDNEY SURGERY Right     [place] SURG DIAGNOSTIC EXAM, ANORECTAL N/A 7/15/2020    Procedure: EXAM UNDER ANESTHESIA (EUA);   Surgeon: Silvino Patel MD;  [facility];  Service: Colorectal    RECTAL BIOPSY N/A 7/15/2020    Procedure: EXCISION  BIOPSY LESION/ MASS  ANAL/RECTAL;  Surgeon: Silvino Patel MD;  [facility];  Service: Colorectal     Social History   Social History     Substance and Sexual Activity   Alcohol Use Yes    Alcohol/week: 2 0 - 3 0 standard drinks    Types: 2 - 3 Glasses of wine per week    Frequency: 2-4 times a month    Drinks per session: 1 or 2     Social History     Substance and Sexual Activity   Drug Use Never     Social History     Tobacco Use   Smoking Status Former Smoker   Smokeless Tobacco Never Used     Family History   Problem Relation Age of Onset    Colon cancer Neg Hx        Meds/Allergies       Current Outpatient Medications:     acetaminophen (TYLENOL) 325 mg tablet    allopurinol (ZYLOPRIM) 100 mg tablet   brimonidine-timolol (Combigan) 0 2-0 5 %    budesonide (ENTOCORT EC) 3 MG capsule    Cetirizine-Pseudoephedrine (KLS ALLER-INNA D PO)    fluticasone (FLONASE) 50 mcg/act nasal spray    LUMIGAN 0 01 % ophthalmic drops    Multiple Vitamins-Minerals (PRESERVISION AREDS 2) CAPS    VITAMIN D PO    Allergies   Allergen Reactions    No Known Allergies            Objective     Blood pressure 118/72, temperature (!) 96 6 °F (35 9 °C), temperature source Tympanic, height 5' (1 524 m), weight 58 1 kg (128 lb)  Body mass index is 25 kg/m²  PHYSICAL EXAMINATION:    General Appearance:   Alert, cooperative, no distress   HEENT:  Normocephalic, atraumatic, anicteric  Neck supple, symmetrical, trachea midline  Lungs:   Equal chest rise and unlabored breathing, normal effort, no coughing  Cardiovascular:   No visualized JVD  Abdomen:  Anal:   No abdominal distension  Small hemorrhoid adjacent to pea-sized area of superficial denuded skin   Skin:   No jaundice, rashes, or lesions  Musculoskeletal:   Normal range of motion visualized  Psych:  Normal affect and normal insight  Neuro:  Alert and appropriate  Lab Results:   No visits with results within 1 Day(s) from this visit     Latest known visit with results is:   Admission on 07/15/2020, Discharged on 07/15/2020   Component Date Value    Case Report 07/15/2020                      Value:Surgical Pathology Report                         Case: [id number]                                   Authorizing Provider:  Tyron Merritt MD      Collected:           07/15/2020 [id number]              Ordering Location:     [address] [place] [address]      Received:            07/15/2020 [address] [place] [address] Operating Room                                                      Pathologist:           Elizabeth Noguera MD                                                         Specimen:    Anus, anal bx  Final Diagnosis 07/15/2020                      Value: This result contains rich text formatting which cannot be displayed here   Additional Information 07/15/2020                      Value: This result contains rich text formatting which cannot be displayed here  [facility] Gross Description 07/15/2020                      Value: This result contains rich text formatting which cannot be displayed here  Lab Results   Component Value Date    WBC 9 77 02/19/2015    HGB 14 0 02/19/2015    HCT 42 7 02/19/2015    MCV 96 02/19/2015     02/19/2015       Lab Results   Component Value Date     02/19/2015    K 4 3 02/19/2015     02/19/2015    CO2 32 (H) 02/19/2015    ANIONGAP 4 02/19/2015    BUN 18 02/19/2015    CREATININE 0 93 02/19/2015    CALCIUM 9 3 02/19/2015    AST 21 02/19/2015    ALT 20 02/19/2015    ALKPHOS 85 02/19/2015    PROT 7 2 02/19/2015    BILITOT 0 6 02/19/2015       No results found for: CRP    No results found for: [id number], TSH    No results found for: IRON, TIBC, FERRITIN    Radiology Results:   No results found

## 2020-10-13 PROBLEM — L29.0 PRURITUS ANI: Status: ACTIVE | Noted: 2020-10-13

## 2021-02-12 DIAGNOSIS — Z23 ENCOUNTER FOR IMMUNIZATION: ICD-10-CM

## 2021-04-12 PROBLEM — R63.4 WEIGHT LOSS, UNINTENTIONAL: Status: ACTIVE | Noted: 2021-04-12

## 2021-04-12 PROBLEM — K62.89 ANAL PAIN: Status: ACTIVE | Noted: 2021-04-12

## 2021-04-12 PROBLEM — R63.0 LOSS OF APPETITE: Status: ACTIVE | Noted: 2021-04-12

## 2021-07-08 ENCOUNTER — TELEPHONE (OUTPATIENT)
Dept: GASTROENTEROLOGY | Facility: HOSPITAL | Age: 86
End: 2021-07-08

## (undated) DEVICE — LUBRICANT SURGILUBE TUBE 4 OZ  FLIP TOP

## (undated) DEVICE — BASIC SINGLE BASIN 2-LF: Brand: MEDLINE INDUSTRIES, INC.

## (undated) DEVICE — GLOVE SRG BIOGEL 8

## (undated) DEVICE — ELECTRODE BLADE MOD  E-Z CLEAN 6.5IN -0014M

## (undated) DEVICE — SPONGE STICK WITH PVP-I: Brand: KENDALL

## (undated) DEVICE — 3000CC GUARDIAN II: Brand: GUARDIAN

## (undated) DEVICE — DISPOSABLE BRIEF/UNDERWEAR

## (undated) DEVICE — GLOVE INDICATOR PI UNDERGLOVE SZ 8.5 BLUE

## (undated) DEVICE — BETHLEHEM UNIVERSAL MINOR GEN: Brand: CARDINAL HEALTH

## (undated) DEVICE — PLUMEPEN PRO 10FT

## (undated) DEVICE — MEDI-VAC YANK SUCT HNDL W/TPRD BULBOUS TIP: Brand: CARDINAL HEALTH

## (undated) DEVICE — CO2 AND WATER TUBING/CAP SET FOR OLYMPUS® SCOPES & UCR: Brand: ERBE

## (undated) DEVICE — GAUZE SPONGES,16 PLY: Brand: CURITY

## (undated) DEVICE — NEEDLE 25G X 1 1/2

## (undated) DEVICE — SUT VICRYL 2-0 SH 27 IN UNDYED J417H

## (undated) DEVICE — INTENDED FOR TISSUE SEPARATION, AND OTHER PROCEDURES THAT REQUIRE A SHARP SURGICAL BLADE TO PUNCTURE OR CUT.: Brand: BARD-PARKER SAFETY BLADES SIZE 15, STERILE

## (undated) DEVICE — TRAVELKIT CONTAINS FIRST STEP KIT (200ML EP-4 KIT) AND SOILED SCOPE BAG - 1 KIT: Brand: TRAVELKIT CONTAINS FIRST STEP KIT AND SOILED SCOPE BAG

## (undated) DEVICE — TUBING SUCTION 5MM X 12 FT

## (undated) DEVICE — PAD GROUNDING ADULT

## (undated) DEVICE — SUT CHROMIC 3-0 SH 27 IN G122H

## (undated) DEVICE — Device: Brand: DEFENDO AIR/WATER/SUCTION AND BIOPSY VALVE

## (undated) DEVICE — ELECTRODE NEEDLE MOD E-Z CLEAN 2.75IN 7CM -0013M

## (undated) DEVICE — SUT VICRYL 3-0 REEL 54 IN J285G

## (undated) DEVICE — SUT CHROMIC 3-0 54 IN L112G

## (undated) DEVICE — BIOPSY PUNCH 3MM DISPOSABLE